# Patient Record
Sex: FEMALE | Race: WHITE | NOT HISPANIC OR LATINO | URBAN - METROPOLITAN AREA
[De-identification: names, ages, dates, MRNs, and addresses within clinical notes are randomized per-mention and may not be internally consistent; named-entity substitution may affect disease eponyms.]

---

## 2019-05-31 ENCOUNTER — COMMUNICATION - HEALTHEAST (OUTPATIENT)
Dept: RESPIRATORY THERAPY | Facility: HOSPITAL | Age: 84
End: 2019-05-31

## 2019-06-04 ENCOUNTER — COMMUNICATION - HEALTHEAST (OUTPATIENT)
Dept: RESPIRATORY THERAPY | Facility: HOSPITAL | Age: 84
End: 2019-06-04

## 2019-06-06 ENCOUNTER — COMMUNICATION - HEALTHEAST (OUTPATIENT)
Dept: RESPIRATORY THERAPY | Facility: HOSPITAL | Age: 84
End: 2019-06-06

## 2019-06-13 ENCOUNTER — COMMUNICATION - HEALTHEAST (OUTPATIENT)
Dept: RESPIRATORY THERAPY | Facility: HOSPITAL | Age: 84
End: 2019-06-13

## 2019-06-28 ENCOUNTER — COMMUNICATION - HEALTHEAST (OUTPATIENT)
Dept: RESPIRATORY THERAPY | Facility: HOSPITAL | Age: 84
End: 2019-06-28

## 2019-07-05 ENCOUNTER — RECORDS - HEALTHEAST (OUTPATIENT)
Dept: ADMINISTRATIVE | Facility: OTHER | Age: 84
End: 2019-07-05

## 2019-07-23 ENCOUNTER — COMMUNICATION - HEALTHEAST (OUTPATIENT)
Dept: NEUROSURGERY | Facility: CLINIC | Age: 84
End: 2019-07-23

## 2019-07-23 DIAGNOSIS — S06.33AA INTRAPARENCHYMAL HEMATOMA OF BRAIN (H): ICD-10-CM

## 2019-07-30 ENCOUNTER — COMMUNICATION - HEALTHEAST (OUTPATIENT)
Dept: RESPIRATORY THERAPY | Facility: HOSPITAL | Age: 84
End: 2019-07-30

## 2019-08-07 ENCOUNTER — RECORDS - HEALTHEAST (OUTPATIENT)
Dept: LAB | Facility: CLINIC | Age: 84
End: 2019-08-07

## 2019-08-07 LAB
ANION GAP SERPL CALCULATED.3IONS-SCNC: 10 MMOL/L (ref 5–18)
BUN SERPL-MCNC: 18 MG/DL (ref 8–28)
CALCIUM SERPL-MCNC: 9 MG/DL (ref 8.5–10.5)
CHLORIDE BLD-SCNC: 106 MMOL/L (ref 98–107)
CO2 SERPL-SCNC: 26 MMOL/L (ref 22–31)
CREAT SERPL-MCNC: 0.79 MG/DL (ref 0.6–1.1)
GFR SERPL CREATININE-BSD FRML MDRD: >60 ML/MIN/1.73M2
GLUCOSE BLD-MCNC: 108 MG/DL (ref 70–125)
LEVETIRACETAM (KEPPRA): 26.7 UG/ML (ref 6–46)
POTASSIUM BLD-SCNC: 4.3 MMOL/L (ref 3.5–5)
SODIUM SERPL-SCNC: 142 MMOL/L (ref 136–145)
VIT B12 SERPL-MCNC: 229 PG/ML (ref 213–816)

## 2019-08-15 ENCOUNTER — HOSPITAL ENCOUNTER (OUTPATIENT)
Dept: CT IMAGING | Facility: CLINIC | Age: 84
Discharge: HOME | End: 2019-08-15
Attending: SURGERY

## 2019-08-15 DIAGNOSIS — S06.33AA INTRAPARENCHYMAL HEMATOMA OF BRAIN (H): ICD-10-CM

## 2019-08-20 ENCOUNTER — OFFICE VISIT - HEALTHEAST (OUTPATIENT)
Dept: NEUROSURGERY | Facility: CLINIC | Age: 84
End: 2019-08-20

## 2019-08-20 ENCOUNTER — COMMUNICATION - HEALTHEAST (OUTPATIENT)
Dept: NEUROSURGERY | Facility: CLINIC | Age: 84
End: 2019-08-20

## 2019-08-20 DIAGNOSIS — I61.2 NONTRAUMATIC HEMORRHAGE OF RIGHT CEREBRAL HEMISPHERE (H): ICD-10-CM

## 2019-08-20 ASSESSMENT — MIFFLIN-ST. JEOR: SCORE: 1310.91

## 2019-08-22 ENCOUNTER — COMMUNICATION - HEALTHEAST (OUTPATIENT)
Dept: NEUROSURGERY | Facility: CLINIC | Age: 84
End: 2019-08-22

## 2019-08-29 ENCOUNTER — COMMUNICATION - HEALTHEAST (OUTPATIENT)
Dept: RESPIRATORY THERAPY | Facility: HOSPITAL | Age: 84
End: 2019-08-29

## 2019-09-30 ENCOUNTER — COMMUNICATION - HEALTHEAST (OUTPATIENT)
Dept: RESPIRATORY THERAPY | Facility: HOSPITAL | Age: 84
End: 2019-09-30

## 2019-10-15 ENCOUNTER — HOSPITAL ENCOUNTER (OUTPATIENT)
Dept: MRI IMAGING | Facility: HOSPITAL | Age: 84
Discharge: HOME | End: 2019-10-15
Attending: SURGERY

## 2019-10-15 DIAGNOSIS — S06.33AA INTRAPARENCHYMAL HEMATOMA OF BRAIN (H): ICD-10-CM

## 2019-10-15 LAB
CREAT BLD-MCNC: 0.9 MG/DL (ref 0.6–1.1)
GFR SERPL CREATININE-BSD FRML MDRD: 60 ML/MIN/1.73M2

## 2019-10-23 ENCOUNTER — COMMUNICATION - HEALTHEAST (OUTPATIENT)
Dept: NEUROSURGERY | Facility: CLINIC | Age: 84
End: 2019-10-23

## 2019-10-23 DIAGNOSIS — S06.33AA INTRAPARENCHYMAL HEMATOMA OF BRAIN (H): ICD-10-CM

## 2019-10-23 DIAGNOSIS — S06.5XAA SDH (SUBDURAL HEMATOMA) (H): ICD-10-CM

## 2019-10-30 ENCOUNTER — COMMUNICATION - HEALTHEAST (OUTPATIENT)
Dept: RESPIRATORY THERAPY | Facility: HOSPITAL | Age: 84
End: 2019-10-30

## 2019-11-29 ENCOUNTER — COMMUNICATION - HEALTHEAST (OUTPATIENT)
Dept: RESPIRATORY THERAPY | Facility: HOSPITAL | Age: 84
End: 2019-11-29

## 2019-12-06 ENCOUNTER — COMMUNICATION - HEALTHEAST (OUTPATIENT)
Dept: TELEHEALTH | Facility: CLINIC | Age: 84
End: 2019-12-06

## 2019-12-06 ENCOUNTER — HOSPITAL ENCOUNTER (OUTPATIENT)
Dept: MRI IMAGING | Facility: HOSPITAL | Age: 84
Discharge: HOME | End: 2019-12-06
Attending: SURGERY

## 2019-12-06 DIAGNOSIS — S06.5XAA SDH (SUBDURAL HEMATOMA) (H): ICD-10-CM

## 2019-12-06 DIAGNOSIS — S06.33AA INTRAPARENCHYMAL HEMATOMA OF BRAIN (H): ICD-10-CM

## 2019-12-06 LAB
CREAT BLD-MCNC: 1 MG/DL (ref 0.6–1.1)
GFR SERPL CREATININE-BSD FRML MDRD: 53 ML/MIN/1.73M2

## 2019-12-10 ENCOUNTER — RECORDS - HEALTHEAST (OUTPATIENT)
Dept: ADMINISTRATIVE | Facility: OTHER | Age: 84
End: 2019-12-10

## 2019-12-12 ENCOUNTER — OFFICE VISIT - HEALTHEAST (OUTPATIENT)
Dept: NEUROSURGERY | Facility: CLINIC | Age: 84
End: 2019-12-12

## 2019-12-12 ASSESSMENT — MIFFLIN-ST. JEOR: SCORE: 1310.91

## 2019-12-24 ENCOUNTER — COMMUNICATION - HEALTHEAST (OUTPATIENT)
Dept: RESPIRATORY THERAPY | Facility: HOSPITAL | Age: 84
End: 2019-12-24

## 2019-12-30 ENCOUNTER — HOSPITAL ENCOUNTER (OUTPATIENT)
Dept: CT IMAGING | Facility: HOSPITAL | Age: 84
Discharge: HOME | End: 2019-12-30
Attending: UROLOGY

## 2019-12-30 DIAGNOSIS — D49.59 NEOPLASM OF UNSPECIFIED BEHAVIOR OF OTHER GENITOURINARY ORGAN: ICD-10-CM

## 2020-01-30 ENCOUNTER — COMMUNICATION - HEALTHEAST (OUTPATIENT)
Dept: RESPIRATORY THERAPY | Facility: HOSPITAL | Age: 85
End: 2020-01-30

## 2020-02-10 ENCOUNTER — RECORDS - HEALTHEAST (OUTPATIENT)
Dept: LAB | Facility: CLINIC | Age: 85
End: 2020-02-10

## 2020-02-10 LAB
ANION GAP SERPL CALCULATED.3IONS-SCNC: 6 MMOL/L (ref 5–18)
BUN SERPL-MCNC: 15 MG/DL (ref 8–28)
CALCIUM SERPL-MCNC: 9.1 MG/DL (ref 8.5–10.5)
CHLORIDE BLD-SCNC: 101 MMOL/L (ref 98–107)
CO2 SERPL-SCNC: 31 MMOL/L (ref 22–31)
CREAT SERPL-MCNC: 0.88 MG/DL (ref 0.6–1.1)
ERYTHROCYTE [DISTWIDTH] IN BLOOD BY AUTOMATED COUNT: 13.6 % (ref 11–14.5)
GFR SERPL CREATININE-BSD FRML MDRD: >60 ML/MIN/1.73M2
GLUCOSE BLD-MCNC: 105 MG/DL (ref 70–125)
HCT VFR BLD AUTO: 40.7 % (ref 35–47)
HGB BLD-MCNC: 12.5 G/DL (ref 12–16)
MCH RBC QN AUTO: 28.4 PG (ref 27–34)
MCHC RBC AUTO-ENTMCNC: 30.7 G/DL (ref 32–36)
MCV RBC AUTO: 93 FL (ref 80–100)
PLATELET # BLD AUTO: 329 THOU/UL (ref 140–440)
PMV BLD AUTO: 9.2 FL (ref 8.5–12.5)
POTASSIUM BLD-SCNC: 4.1 MMOL/L (ref 3.5–5)
RBC # BLD AUTO: 4.4 MILL/UL (ref 3.8–5.4)
SODIUM SERPL-SCNC: 138 MMOL/L (ref 136–145)
WBC: 7.5 THOU/UL (ref 4–11)

## 2020-02-27 ENCOUNTER — AMBULATORY - HEALTHEAST (OUTPATIENT)
Dept: OTHER | Facility: CLINIC | Age: 85
End: 2020-02-27

## 2020-03-03 ENCOUNTER — COMMUNICATION - HEALTHEAST (OUTPATIENT)
Dept: RESPIRATORY THERAPY | Facility: HOSPITAL | Age: 85
End: 2020-03-03

## 2020-03-06 ENCOUNTER — RECORDS - HEALTHEAST (OUTPATIENT)
Dept: LAB | Facility: CLINIC | Age: 85
End: 2020-03-06

## 2020-03-09 LAB
ANION GAP SERPL CALCULATED.3IONS-SCNC: 8 MMOL/L (ref 5–18)
BASOPHILS # BLD AUTO: 0 THOU/UL (ref 0–0.2)
BASOPHILS NFR BLD AUTO: 1 % (ref 0–2)
BUN SERPL-MCNC: 13 MG/DL (ref 8–28)
CALCIUM SERPL-MCNC: 9.3 MG/DL (ref 8.5–10.5)
CHLORIDE BLD-SCNC: 103 MMOL/L (ref 98–107)
CO2 SERPL-SCNC: 27 MMOL/L (ref 22–31)
CREAT SERPL-MCNC: 0.8 MG/DL (ref 0.6–1.1)
EOSINOPHIL # BLD AUTO: 0.3 THOU/UL (ref 0–0.4)
EOSINOPHIL NFR BLD AUTO: 5 % (ref 0–6)
ERYTHROCYTE [DISTWIDTH] IN BLOOD BY AUTOMATED COUNT: 13.2 % (ref 11–14.5)
GFR SERPL CREATININE-BSD FRML MDRD: >60 ML/MIN/1.73M2
GLUCOSE BLD-MCNC: 113 MG/DL (ref 70–125)
HCT VFR BLD AUTO: 39.6 % (ref 35–47)
HGB BLD-MCNC: 12.3 G/DL (ref 12–16)
LYMPHOCYTES # BLD AUTO: 1.3 THOU/UL (ref 0.8–4.4)
LYMPHOCYTES NFR BLD AUTO: 20 % (ref 20–40)
MCH RBC QN AUTO: 28.2 PG (ref 27–34)
MCHC RBC AUTO-ENTMCNC: 31.1 G/DL (ref 32–36)
MCV RBC AUTO: 91 FL (ref 80–100)
MONOCYTES # BLD AUTO: 0.4 THOU/UL (ref 0–0.9)
MONOCYTES NFR BLD AUTO: 5 % (ref 2–10)
NEUTROPHILS # BLD AUTO: 4.5 THOU/UL (ref 2–7.7)
NEUTROPHILS NFR BLD AUTO: 69 % (ref 50–70)
PLATELET # BLD AUTO: 343 THOU/UL (ref 140–440)
PMV BLD AUTO: 8.8 FL (ref 8.5–12.5)
POTASSIUM BLD-SCNC: 4.4 MMOL/L (ref 3.5–5)
RBC # BLD AUTO: 4.36 MILL/UL (ref 3.8–5.4)
SODIUM SERPL-SCNC: 138 MMOL/L (ref 136–145)
WBC: 6.5 THOU/UL (ref 4–11)

## 2020-03-30 ENCOUNTER — COMMUNICATION - HEALTHEAST (OUTPATIENT)
Dept: RESPIRATORY THERAPY | Facility: HOSPITAL | Age: 85
End: 2020-03-30

## 2020-08-10 ENCOUNTER — RECORDS - HEALTHEAST (OUTPATIENT)
Dept: LAB | Facility: CLINIC | Age: 85
End: 2020-08-10

## 2020-08-10 LAB
ALBUMIN UR-MCNC: ABNORMAL MG/DL
APPEARANCE UR: ABNORMAL
BACTERIA #/AREA URNS HPF: ABNORMAL HPF
BILIRUB UR QL STRIP: NEGATIVE
COLOR UR AUTO: YELLOW
GLUCOSE UR STRIP-MCNC: NEGATIVE MG/DL
HGB UR QL STRIP: NEGATIVE
KETONES UR STRIP-MCNC: NEGATIVE MG/DL
LEUKOCYTE ESTERASE UR QL STRIP: ABNORMAL
MUCOUS THREADS #/AREA URNS LPF: ABNORMAL LPF
NITRATE UR QL: POSITIVE
PH UR STRIP: 5.5 [PH] (ref 4.5–8)
RBC #/AREA URNS AUTO: ABNORMAL HPF
SP GR UR STRIP: 1.02 (ref 1–1.03)
SQUAMOUS #/AREA URNS AUTO: ABNORMAL LPF
UROBILINOGEN UR STRIP-ACNC: ABNORMAL
WBC #/AREA URNS AUTO: >100 HPF
WBC CLUMPS #/AREA URNS HPF: PRESENT /[HPF]

## 2020-08-12 LAB
BACTERIA SPEC CULT: ABNORMAL
BACTERIA SPEC CULT: ABNORMAL

## 2020-09-29 ENCOUNTER — RECORDS - HEALTHEAST (OUTPATIENT)
Dept: LAB | Facility: CLINIC | Age: 85
End: 2020-09-29

## 2020-09-30 LAB
ANION GAP SERPL CALCULATED.3IONS-SCNC: 8 MMOL/L (ref 5–18)
BUN SERPL-MCNC: 16 MG/DL (ref 8–28)
CALCIUM SERPL-MCNC: 8.9 MG/DL (ref 8.5–10.5)
CHLORIDE BLD-SCNC: 106 MMOL/L (ref 98–107)
CO2 SERPL-SCNC: 27 MMOL/L (ref 22–31)
CREAT SERPL-MCNC: 0.76 MG/DL (ref 0.6–1.1)
GFR SERPL CREATININE-BSD FRML MDRD: >60 ML/MIN/1.73M2
GLUCOSE BLD-MCNC: 105 MG/DL (ref 70–125)
POTASSIUM BLD-SCNC: 4.1 MMOL/L (ref 3.5–5)
SODIUM SERPL-SCNC: 141 MMOL/L (ref 136–145)

## 2020-11-06 ENCOUNTER — RECORDS - HEALTHEAST (OUTPATIENT)
Dept: LAB | Facility: CLINIC | Age: 85
End: 2020-11-06

## 2020-11-06 LAB
ALBUMIN UR-MCNC: ABNORMAL MG/DL
APPEARANCE UR: ABNORMAL
BACTERIA #/AREA URNS HPF: ABNORMAL HPF
BILIRUB UR QL STRIP: NEGATIVE
COLOR UR AUTO: YELLOW
GLUCOSE UR STRIP-MCNC: NEGATIVE MG/DL
HGB UR QL STRIP: NEGATIVE
KETONES UR STRIP-MCNC: NEGATIVE MG/DL
LEUKOCYTE ESTERASE UR QL STRIP: ABNORMAL
MUCOUS THREADS #/AREA URNS LPF: ABNORMAL LPF
NITRATE UR QL: POSITIVE
PH UR STRIP: 5.5 [PH] (ref 4.5–8)
RBC #/AREA URNS AUTO: ABNORMAL HPF
SP GR UR STRIP: 1.03 (ref 1–1.03)
SQUAMOUS #/AREA URNS AUTO: ABNORMAL LPF
UROBILINOGEN UR STRIP-ACNC: ABNORMAL
WBC #/AREA URNS AUTO: ABNORMAL HPF

## 2020-11-09 LAB
BACTERIA SPEC CULT: ABNORMAL
BACTERIA SPEC CULT: ABNORMAL

## 2021-05-29 NOTE — TELEPHONE ENCOUNTER
14 Day follow up phone call for the COPD Program. No answer. Left message with daughter Keren and call back number for any questions or concerns about Jamie.

## 2021-05-29 NOTE — TELEPHONE ENCOUNTER
COPD Education    Called patient's daughter and left message. Told her to call if she had any questions and that we would call again next in a couple of days.   Our phone number is [phone].    PRESTON Burgess, COPD educator

## 2021-05-29 NOTE — TELEPHONE ENCOUNTER
COPD educator note    Talked with Jamie's daughter Keren today. She states her mom is doing great. He breathing is the best it has been in a long time. Keren had no questions at this time. We will call again next week.    TUAN BurgessT

## 2021-05-29 NOTE — TELEPHONE ENCOUNTER
COPD educator note    Talked with daughter Keren today. She states her mom is doing well. Pt had no questions at this time. We will call again next week.    PRESTON Burgess

## 2021-05-30 NOTE — TELEPHONE ENCOUNTER
PATIENT NAME:  Meseret Holbrook  YOB: 1935  MRN: [id number]  SURGEON: DR. WOOD  DATE of CONSULT:  7-19-19  DIAGNOSIS:  RIGHT FRONTAL INTRAPARENCHYMAL HEMORRHAGE; Somewhat suspicious for underlying mass. No known hx of malignancy    FOLLOW-UP:    Post HOSPITAL CONSULT F/U Visit: 4 weeks   Post-op Provider: NP  DIAGNOSTICS:  radiology: CT scan: HEAD, WITHOUT  (ORDERED 7-23-19)  DISPOSITION:  [facility] [phone] ON 7-23-19    ADDITIONAL INSTRUCTIONS FOR MEDICAL STAFF:    No blood thinners, including aspirin  Keppra 500 mg two times a day--ongoing management by neurology     plan follow up head CT in 4 weeks and MRI in 3-6 months after blood resolves (mri ORDERED 7-23-19)

## 2021-05-30 NOTE — TELEPHONE ENCOUNTER
COPD Education    Called patient and left message. Told patient's daughter to call if they had any questions and that we would call again next month.   Our phone number is [phone].    PRESTON Burgess, COPD educator

## 2021-05-30 NOTE — TELEPHONE ENCOUNTER
VIC, THE MRI IS SUPPOSED TO BE 3-6 MONTHS AFTER, NOT WEEKS.....  Kassie Matthews, RN   HE Neurosurgery Nurse Navigator

## 2021-05-30 NOTE — TELEPHONE ENCOUNTER
Lorena,  Can you add to discharge paperwork?    8/15/19 at 10:30a [facility] radiology MRI    8/28/19 at 9:00a Neurosurgery appointment with DELVIN Skinner will have nurse call our office back

## 2021-05-31 NOTE — TELEPHONE ENCOUNTER
Home health care nurse is calling wondering if the patient was supposed to continue her Kepra or d/c it. Please advise and call Hiwot frederick @ [phone] Okay to LM

## 2021-05-31 NOTE — TELEPHONE ENCOUNTER
COPD Education    Called patient and left message. Told patient to call if she had any questions and that we would call again next month.   Our phone number is [phone].    PRESTON Burgess, COPD educator

## 2021-05-31 NOTE — PATIENT INSTRUCTIONS - HE
1. Follow up with Dr Gold in 2 months with repeat MRI (no need for CT). We will assist getting this appointment set up.     2. Follow up with Neurosurgery as needed    3. Call [phone] with the following symptoms:    *Worsening pain not relieved by the  prescription given  *Worsening headache not relieved by the prescription given  *A headache that gets better or worse when you stand up or lie down  *Seizures  *Persistent nausea and vomiting  *Increased sleepiness or difficulty being awakened  *Change in ability to walk, see, think, or talk  *Worsening or new onset of weakness, or numbness and tingling  *Loss or change in your ability to control bowel or bladder function    !!!! IF YOU HAVE A SERIOUS OR THREATENING EMERGENCY, CALL [phone] OR COME TO THE EMERGENCY ROOM.  IF YOUR CONDITIONS ALLOWS COME TO THE HOSPITAL WHERE YOUR SURGERY WAS PERFORMED.    4. Continue to avoid anticoagulation until follow up with Neurology (Dr Gold). No Aspirin, Ibuprofen, Motrin, Advil, Aleve, Naproxen, or NSAIDs.

## 2021-05-31 NOTE — PROGRESS NOTES
NEUROSURGERY FOLLOW UP EVALUATION:    ASSESSMENT  Meseret Holbrook is a 83 y.o. female, who presents today for follow up from hospitalization 7/19/2019 found to have right frontal intraparenchymal hemorrhage. Brought in by family for increased confusion. Suspicious for underlying lesion. MRI negative. Raymon has vascular dementia. Could have amyloid hemorrhage with microbleeds noted on MRI. Chronic 2 mm subdural hematoma right. Followed by Dr Kenyon. CT today, shows resolution of chronic subdural and expected evolution of anterior right frontal intraparenchymal hemorrhage. She should follow up in 2 months with Head MRI with and without contrast with Dr Callaway.     Today she reports she feels fine. Denies headache, visual disturbance. She has dementia. Is unable to give me any history or tell me who her daughter is in relationship or name. Per her daughter, she is doing well, near her baseline. Has good days and bad days.     PLAN:   1. Follow up with Dr Gold in 2 months with repeat MRI (no need for CT). We will assist getting this appointment set up.     2. Follow up with Neurosurgery as needed    3. Call [phone] with the following symptoms:    *Worsening pain not relieved by the  prescription given  *Worsening headache not relieved by the prescription given  *A headache that gets better or worse when you stand up or lie down  *Seizures  *Persistent nausea and vomiting  *Increased sleepiness or difficulty being awakened  *Change in ability to walk, see, think, or talk  *Worsening or new onset of weakness, or numbness and tingling  *Loss or change in your ability to control bowel or bladder function    !!!! IF YOU HAVE A SERIOUS OR THREATENING EMERGENCY, CALL 911 OR COME TO THE EMERGENCY ROOM.  IF YOUR CONDITIONS ALLOWS COME TO THE HOSPITAL WHERE YOUR SURGERY WAS PERFORMED.    4. Continue to avoid anticoagulation until follow up with Neurology (Dr Gold). No Aspirin, Ibuprofen, Motrin, Advil,  Aleve, Naproxen, or NSAIDs.    HPI: Meseret Holbrook is an 83-year-old female with past medical history significant for COPD, hypertension, dementia who presented to the emergency department on July 18, 2019 for evaluation of increasing confusion, incontinence of urine.  Family reports that at baseline she does not know the date or time and can have word finding difficulty however she was drowsy and significantly more confused than her norm.  A CT scan obtained in the emergency department revealed a right frontal intraparenchymal hemorrhage.  The neurosurgical service was consulted for further recommendations.  She was transferred to [facility] for further treatment.     Past Medical History:   Diagnosis Date     COPD (chronic obstructive pulmonary disease) (H)      Dementia       Past Surgical History:   Procedure Laterality Date     CATARACT EXTRACTION, BILATERAL       CHOLECYSTECTOMY       CYSTOSCOPY       HYSTERECTOMY         Current Outpatient Medications   Medication Sig     acetaminophen (TYLENOL) 500 MG tablet Take 500 mg by mouth every 6 (six) hours as needed for pain.     albuterol (PROAIR HFA;PROVENTIL HFA;VENTOLIN HFA) 90 mcg/actuation inhaler Inhale 2 puffs every 6 (six) hours as needed for wheezing.     carboxymethylcell-hypromellose (GENTEAL GEL) 0.25-0.3 % DLGl Apply 1 drop to eye every 4 (four) hours as needed (dry eye).            cetirizine (ZYRTEC) 10 MG tablet Take 10 mg by mouth daily.     citalopram (CELEXA) 20 MG tablet Take 20 mg by mouth daily.     donepezil (ARICEPT) 10 MG tablet Take 10 mg by mouth at bedtime.     fluticasone propionate (FLOVENT HFA) 110 mcg/actuation inhaler Inhale 2 puffs 2 (two) times a day.     guaiFENesin (ROBITUSSIN) 100 mg/5 mL syrup Take 100 mg by mouth 3 (three) times a day as needed for cough.     ipratropium-albuterol (DUO-NEB) 0.5-2.5 mg/3 mL nebulizer Inhale 3 mL 2 (two) times a day.            ipratropium-albuterol (DUO-NEB) 0.5-2.5 mg/3 mL  nebulizer Take 3 mL by nebulization every 6 (six) hours as needed (shortness of breath).     memantine (NAMENDA) 5 MG tablet Take 5 mg by mouth 2 (two) times a day.       No Known Allergies    PHYSICAL EXAM:  Visit Vitals  /75   Pulse 74   Ht 5' (1.524 m)   Wt 206 lb (93.4 kg)   SpO2 96%   BMI 40.23 kg/m      Alert and oriented x3, speech hesitant, baseline  PERRL, EOMI, face symmetric, tongue midline, shoulder shrug equal  No pronator drift, finger to nose smooth and accurate bilaterally  Arm strength: 5/5  Leg strength: 5/5   Bulk and tone: normal for age  Coordination/Gait steady with walker     IMAGING:  The imaging was personally reviewed by me.   FINDINGS:    CT head:  INTRACRANIAL CONTENTS: Expected interval evolution of intraparenchymal hemorrhage in the anterior right frontal lobe is now nearly completely low in attenuation. Resolution of previously seen subdural hemorrhage over the right cerebral convexity.   Unchanged encephalomalacia in the parasagittal left frontal lobe. Severe presumed chronic small vessel ischemic changes. Moderate generalized volume loss. No hydrocephalus.      VISUALIZED ORBITS/SINUSES/MASTOIDS: Prior bilateral cataract surgery. Visualized portions of the orbits are otherwise unremarkable. Minimal mucosal thickening in the maxillary sinuses. No significant middle ear or mastoid effusion.     BONES/SOFT TISSUES: No significant abnormality.     IMPRESSION:   1.  Expected interval evolution of anterior right frontal parenchymal hemorrhage.  2.  Resolution of right frontotemporal subdural hematoma.  3.  No new intracranial hemorrhage.    TERRELL Aguilar-[facility] Neuro[facility]  O: [phone]

## 2021-05-31 NOTE — TELEPHONE ENCOUNTER
Confirmed with PEARL Morales that Meseret should stay on Keppra until seen by neurology in 2 months. Called and informed Hiwot from TCU.  Ирина Guillen RN, CNRN

## 2021-06-01 NOTE — TELEPHONE ENCOUNTER
COPD educator note    Talked with Jamie's daughter Keren today. She states Jamie is stable and doing well. She had no questions at this time. We will call again next month.    PRESTON Burgess

## 2021-06-02 NOTE — TELEPHONE ENCOUNTER
Monthly follow up phone call for the COPD Program. Spoke with Jamie's daughter Keren for ongoing COPD Education. Keren said her mom is doing well. No questions or concerns today.

## 2021-06-02 NOTE — TELEPHONE ENCOUNTER
Pt's case was reviewed at Brain Tumor Conference on 10-17-19.  She had a right frontal intraparenchymal hemorrhage 7-19-19 that was somewhat suspicious for an underlying mass. Plan was to get MRI 3-6 months post bleed to check for underlying lesion.   She had an MRI on 10-15-19 that showed: Area of rim enhancement in the anterior right frontal lobe in the area of prior hemorrhage likely relates to the evolving hematoma although underlying lesion is not definitely excluded. Consider follow-up MRI in approximately one month to ensure the   lesion is not enlarging.  MRI was reviewed and consensus was to get MRI in 6-8 weeks to make sure there is no underlying mass  and f/u with Dr. Kenyon after imaging.   I will inform pt and daughter.  Kassie Mtathews RN

## 2021-06-03 VITALS — WEIGHT: 206 LBS | HEIGHT: 60 IN | BODY MASS INDEX: 40.44 KG/M2

## 2021-06-03 NOTE — TELEPHONE ENCOUNTER
COPD Education    Called patient's daughter and left message. Told her to call if she or her mom had any questions and that we would call again next month.   Our phone number is [phone].    PRESTON Burgess, COPD educator

## 2021-06-04 VITALS
HEART RATE: 82 BPM | DIASTOLIC BLOOD PRESSURE: 69 MMHG | WEIGHT: 206 LBS | SYSTOLIC BLOOD PRESSURE: 111 MMHG | HEIGHT: 60 IN | OXYGEN SATURATION: 94 % | BODY MASS INDEX: 40.44 KG/M2

## 2021-06-04 NOTE — TELEPHONE ENCOUNTER
COPD educator note    Talked with Jamie's daughter Keren today. She states her mom is ok and her breathing is fine. She had no questions at this time. We will call again next month.    TUAN BurgessT

## 2021-06-04 NOTE — PROGRESS NOTES
Jmaie is here to f/u on MRI results of brain. Pt is here with daughter. Daughter states she has been having a lot of HA and confusion. Pt was not sure of last name today when asked.   Disha,CMA

## 2021-06-04 NOTE — PROGRESS NOTES
NEUROSURGERY FOLLOWUP  NOTE    Meseret Holbrook comes today in f/u from hospitalization 7/19/2019 found to have right frontal intraparenchymal hemorrhage. At our last visit, she was doing well. She continues to do OK. Has ongoing headaches but does have a h/o chronic headaches.      The pts PMH, PSH, ROS, Meds, allergies, SH, FH are all unchanged and summarized in the pts health history from last visit        PHYSICAL EXAM:   Constitutional: /69   Pulse 82   Ht 5' (1.524 m)   Wt 206 lb (93.4 kg)   SpO2 94%   BMI 40.23 kg/m       Mental Status: A & O person and place only. Slow to respond.     Cranial Nerves: CN1: grossly intact per patient recall. CN2: No funduscopic exam performed. CN3,4 & 6: Pupillary light response, lateral and vertical gaze normal.  No nystagmus.  Visual fields are full to confrontation. CN5: Intact to touch CN7: No facial weakness, smile, facial symmetry intact. CN8: Intact to spoken voice. CN9&10: Gag reflex, uvula midline, palate rises with phonation. CN11: Shoulder shrug 5/5 intact bilaterally. CN12: Tongue midline and moves freely from side to side.     Motor: No pronator drift of upper extremity. Normal bulk and tone all muscle groups of upper and lower extremities.     Sensory: Sensation intact bilaterally to light touch.     Coordination:  In wheelchair. Gait not accessed    IMAGING:   I personally reviewed all radiographic images        CONSULTATION ASSESSMENT AND PLAN:    83 yo female who presents after hospitalization on 7/19/2019 found to have right frontal intraparenchymal hemorrhage. Ongoing headaches but does have a h/o chronic headaches. MRI shows stable area of right frontal encephalomalacia and areas of older hemorrhage in left frontal lobe. 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 No underlying mass. Patient may f/u with neurology.     I spent more than 15 minutes in this apt, examining the pt, reviewing the scans, reviewing notes from chart, discussing  treatment options with risks and benefits and coordinating care. >50 % clinic time was spent in face to face counseling and coordinating care    Sommer Kenyon      CC:     Oneil Lim MD  [address]  [facility] [address]

## 2021-06-06 NOTE — TELEPHONE ENCOUNTER
Monthly follow up phone call for the COPD Program. Spoke with Jamie's daughter Keren today for ongoing COPD Education. Keren said Jamie had an exacerbation a couple of week ago, but went to her physician and got started on antibiotics and steroids and she is doing much better. Keren had no questions or concerns.

## 2021-06-07 NOTE — TELEPHONE ENCOUNTER
Monthly follow up phone call for the COPD Program. Spoke with Jamie Veliz's daughter today for ongoing COPD Education. Keren said Jamie is doing well. She hasn't been able to visit with the restrictions though. She had no questions or concerns today.

## 2021-06-16 PROBLEM — N30.00 ACUTE CYSTITIS WITHOUT HEMATURIA: Status: ACTIVE | Noted: 2019-07-24

## 2021-06-16 PROBLEM — E66.01 CLASS 3 SEVERE OBESITY WITH BODY MASS INDEX (BMI) OF 45.0 TO 49.9 IN ADULT (H): Status: ACTIVE | Noted: 2020-02-19

## 2021-06-16 PROBLEM — J44.1 COPD WITH ACUTE EXACERBATION (H): Status: ACTIVE | Noted: 2019-05-22

## 2021-06-16 PROBLEM — I68.0 CEREBRAL AMYLOID ANGIOPATHY (CODE): Chronic | Status: ACTIVE | Noted: 2020-02-19

## 2021-06-16 PROBLEM — G93.40 ENCEPHALOPATHY: Status: ACTIVE | Noted: 2019-07-19

## 2021-06-16 PROBLEM — E66.813 CLASS 3 SEVERE OBESITY WITH BODY MASS INDEX (BMI) OF 45.0 TO 49.9 IN ADULT (H): Status: ACTIVE | Noted: 2020-02-19

## 2021-06-16 PROBLEM — F03.90 DEMENTIA (H): Status: ACTIVE | Noted: 2019-07-24

## 2021-06-16 PROBLEM — G93.6 BRAIN EDEMA (H): Status: ACTIVE | Noted: 2019-07-19

## 2021-06-16 PROBLEM — E53.8 VITAMIN B12 DEFICIENCY: Status: ACTIVE | Noted: 2020-02-19

## 2021-06-16 PROBLEM — I61.9 INTRAPARENCHYMAL HEMORRHAGE OF BRAIN (H): Status: ACTIVE | Noted: 2019-07-18

## 2021-06-16 PROBLEM — Z78.9 LIVES IN LONG-TERM CARE FACILITY: Status: ACTIVE | Noted: 2020-02-20

## 2021-06-16 PROBLEM — Z66 DO NOT RESUSCITATE: Status: ACTIVE | Noted: 2019-07-24

## 2021-06-16 PROBLEM — R29.898 LIMB WEAKNESS: Status: ACTIVE | Noted: 2020-02-17

## 2021-06-16 PROBLEM — J44.9 COPD (CHRONIC OBSTRUCTIVE PULMONARY DISEASE) (H): Status: ACTIVE | Noted: 2019-05-24
